# Patient Record
(demographics unavailable — no encounter records)

---

## 2025-01-06 NOTE — PHYSICAL EXAM
[Fully active, able to carry on all pre-disease performance without restriction] : Status 0 - Fully active, able to carry on all pre-disease performance without restriction [Obese] : obese [Normal] : affect appropriate [de-identified] : RR, may be a very soft systolic murmur at the aortic area [de-identified] : Some voluntary guarding

## 2025-01-06 NOTE — HISTORY OF PRESENT ILLNESS
[Disease:__________________________] : Disease: [unfilled] [de-identified] : The patient is coming for his regularly scheduled follow up. He is being followed for mild thrombocytopenia, borderline low anemia and leukopenia which has resolved. The work up, short of bone marrow studies, was negative.  Further workup showed low retic count, normal LDH, myeloma panel, JIAN. He continues on folic acid supplementation.  No bleeding or bruising. No B symptoms.  He is continuing to work normally.  His cardiac work up had shown "mild leaky valve". He was complaining of some discomfort R lower rib area recently. PCP ordered US which did not reveal any abnormality.

## 2025-01-06 NOTE — ASSESSMENT
[FreeTextEntry1] : 1. Thrombocytopenia, asymptomatic, mild. Platelets normal today, 132,000. 2. Anemia, mild, with macrocytic indexes, with normal B12, MMA and folate levels. JIAN, RF, myeloma panel, LDH, CMP within normal limits.  3. Borderline low WBC count, resolved, WBC 6.15 today.   The situation was discussed with the patient.  Will hold off radiological studies or bone marrow biopsy at this time.  He is recommended continued observation for the time being especially that the hemogram looks improved.  All questions answered.  RTC 3 months or sooner if new issues arise.

## 2025-04-07 NOTE — PHYSICAL EXAM
[Fully active, able to carry on all pre-disease performance without restriction] : Status 0 - Fully active, able to carry on all pre-disease performance without restriction [Obese] : obese [Normal] : affect appropriate [de-identified] : RR, may be a very soft systolic murmur at the aortic area [de-identified] : Some voluntary guarding

## 2025-04-07 NOTE — ASSESSMENT
[FreeTextEntry1] : 1. Thrombocytopenia, asymptomatic, mild. Platelets normal today, 121,000. 2. Anemia, now resolved, with normal B12, MMA and folate levels. JIAN, RF, myeloma panel, LDH, CMP within normal limits.  3. Borderline low WBC count, resolved, WBC 4.69 today.  CBC reviewed today: WBC 4.69, Hb 14.0  Plt  121K   The situation was discussed with the patient.  Will hold off radiological studies and further work up. We will check his peripheral smear for pseudo thrombocytopenia.  He is recommended continued observation for the time being especially that the hemogram looks improved.  His colonoscopy was in 2020. He was recommended to repeat in 5 years. We advised him to reach out his gastroenterologist.   All questions answered.  RTC 3 months or sooner if new issues arise.

## 2025-04-07 NOTE — HISTORY OF PRESENT ILLNESS
[Disease:__________________________] : Disease: [unfilled] [de-identified] : The patient is coming for his regularly scheduled follow up. He is being followed for mild thrombocytopenia, borderline low anemia and leukopenia which has resolved. The work up, short of bone marrow studies, was negative.  In the past, his work up showed low retic count, normal LDH, myeloma panel, JIAN.  No bleeding or bruising. No B symptoms.  He is continuing to work normally.  His cardiac work up had shown "mild leaky valve".

## 2025-04-07 NOTE — PHYSICAL EXAM
[Fully active, able to carry on all pre-disease performance without restriction] : Status 0 - Fully active, able to carry on all pre-disease performance without restriction [Obese] : obese [Normal] : affect appropriate [de-identified] : RR, may be a very soft systolic murmur at the aortic area [de-identified] : Some voluntary guarding

## 2025-04-07 NOTE — HISTORY OF PRESENT ILLNESS
[Disease:__________________________] : Disease: [unfilled] [de-identified] : The patient is coming for his regularly scheduled follow up. He is being followed for mild thrombocytopenia, borderline low anemia and leukopenia which has resolved. The work up, short of bone marrow studies, was negative.  In the past, his work up showed low retic count, normal LDH, myeloma panel, JIAN.  No bleeding or bruising. No B symptoms.  He is continuing to work normally.  His cardiac work up had shown "mild leaky valve".

## 2025-04-07 NOTE — HISTORY OF PRESENT ILLNESS
[Disease:__________________________] : Disease: [unfilled] [de-identified] : The patient is coming for his regularly scheduled follow up. He is being followed for mild thrombocytopenia, borderline low anemia and leukopenia which has resolved. The work up, short of bone marrow studies, was negative.  In the past, his work up showed low retic count, normal LDH, myeloma panel, JINA.  No bleeding or bruising. No B symptoms.  He is continuing to work normally.  His cardiac work up had shown "mild leaky valve".

## 2025-04-07 NOTE — BEGINNING OF VISIT
[0] : 2) Feeling down, depressed, or hopeless: Not at all (0) [Never] : Never [Patient/Caregiver not ready to engage] : Patient/Caregiver not ready to engage

## 2025-04-07 NOTE — PHYSICAL EXAM
[Fully active, able to carry on all pre-disease performance without restriction] : Status 0 - Fully active, able to carry on all pre-disease performance without restriction [Obese] : obese [Normal] : affect appropriate [de-identified] : RR, may be a very soft systolic murmur at the aortic area [de-identified] : Some voluntary guarding

## 2025-04-07 NOTE — HISTORY OF PRESENT ILLNESS
[Disease:__________________________] : Disease: [unfilled] [de-identified] : The patient is coming for his regularly scheduled follow up. He is being followed for mild thrombocytopenia, borderline low anemia and leukopenia which has resolved. The work up, short of bone marrow studies, was negative.  In the past, his work up showed low retic count, normal LDH, myeloma panel, JIAN.  No bleeding or bruising. No B symptoms.  He is continuing to work normally.  His cardiac work up had shown "mild leaky valve".

## 2025-04-07 NOTE — PHYSICAL EXAM
[Fully active, able to carry on all pre-disease performance without restriction] : Status 0 - Fully active, able to carry on all pre-disease performance without restriction [Obese] : obese [Normal] : affect appropriate [de-identified] : RR, may be a very soft systolic murmur at the aortic area [de-identified] : Some voluntary guarding

## 2025-07-24 NOTE — PHYSICAL EXAM
[Alert] : alert [Normal Voice/Communication] : normal voice/communication [No Acute Distress] : no acute distress [Well Developed] : well developed [Obese (BMI >= 30)] : obese (BMI >= 30) [Sclera] : the sclera and conjunctiva were normal [Hearing Threshold Finger Rub Not Rowan] : hearing was normal [Normal Appearance] : the appearance of the neck was normal [No Respiratory Distress] : no respiratory distress [No Acc Muscle Use] : no accessory muscle use [Respiration, Rhythm And Depth] : normal respiratory rhythm and effort [Auscultation Breath Sounds / Voice Sounds] : lungs were clear to auscultation bilaterally [Heart Rate And Rhythm] : heart rate was normal and rhythm regular [Normal S1, S2] : normal S1 and S2 [Bowel Sounds] : normal bowel sounds [Abdomen Tenderness] : non-tender [Abdomen Soft] : soft [Abnormal Walk] : normal gait [Normal Color / Pigmentation] : normal skin color and pigmentation [Oriented To Time, Place, And Person] : oriented to person, place, and time

## 2025-07-24 NOTE — END OF VISIT
[FreeTextEntry3] :  I, Dr. Lopez, personally performed the evaluation and management (E/M) services for this new patient. That E/M includes conducting the clinically appropriate initial history &/or exam, assessing all conditions, and establishing the plan of care. Today, my ANGEL, was here to observe my evaluation and management service for this patient & follow plan of care established by me going forward.  [Time Spent: ___ minutes] : I have spent [unfilled] minutes of time on the encounter which excludes teaching and separately reported services.

## 2025-07-24 NOTE — HISTORY OF PRESENT ILLNESS
[FreeTextEntry1] : 58 year old male patient average risk for CRC, with HTN, DM, hypothyroidism, HLP, Mild Thrombocytopenia, presents for CRC screening colonoscopy.  He denied heartburn, abd pain, vomiting, dysphagia, unintentional weight loss, blood in the stool, or constipation. He gets intermittent diarrhea since he started the mounjaro, 3 times a day when it occurs, watery at times, last noted 2 days ago but does not occur q week.   Last colonoscopy 8/24/20: 1 hyperplastic polyp removed, mild diverticulosis, and external hemorrhoids   Labs 7/9/25: CBC Hgb 13.5  plts 129K CMP ALT 50, rest of LFTs nl CMP 1/25 AST 56   4/7/25: CBC Hgb 14.0 plts 121K

## 2025-07-24 NOTE — ASSESSMENT
[FreeTextEntry1] : 58 year old male patient average risk for CRC, with HTN, DM, hypothyroidism, HLP, Mild Thrombocytopenia, presents for CRC screening colonoscopy.     Screening colonoscopy Colon polyps - Will schedule a colonoscopy - Risks and benefits discussed with patient   I have spent 50 minutes of time on the encounter which excludes teaching time and/or separately reported services.